# Patient Record
Sex: MALE | Race: OTHER | NOT HISPANIC OR LATINO | URBAN - METROPOLITAN AREA
[De-identification: names, ages, dates, MRNs, and addresses within clinical notes are randomized per-mention and may not be internally consistent; named-entity substitution may affect disease eponyms.]

---

## 2020-12-01 ENCOUNTER — INPATIENT (INPATIENT)
Facility: HOSPITAL | Age: 70
LOS: 0 days | Discharge: HOME | DRG: 177 | End: 2020-12-02
Attending: INTERNAL MEDICINE | Admitting: INTERNAL MEDICINE
Payer: COMMERCIAL

## 2020-12-01 VITALS
HEART RATE: 102 BPM | TEMPERATURE: 100 F | DIASTOLIC BLOOD PRESSURE: 99 MMHG | OXYGEN SATURATION: 92 % | RESPIRATION RATE: 18 BRPM | HEIGHT: 66 IN | WEIGHT: 145.06 LBS | SYSTOLIC BLOOD PRESSURE: 195 MMHG

## 2020-12-01 DIAGNOSIS — E87.5 HYPERKALEMIA: ICD-10-CM

## 2020-12-01 DIAGNOSIS — I77.0 ARTERIOVENOUS FISTULA, ACQUIRED: Chronic | ICD-10-CM

## 2020-12-01 DIAGNOSIS — R94.31 ABNORMAL ELECTROCARDIOGRAM [ECG] [EKG]: ICD-10-CM

## 2020-12-01 DIAGNOSIS — D64.9 ANEMIA, UNSPECIFIED: ICD-10-CM

## 2020-12-01 DIAGNOSIS — N18.6 END STAGE RENAL DISEASE: ICD-10-CM

## 2020-12-01 DIAGNOSIS — R19.7 DIARRHEA, UNSPECIFIED: ICD-10-CM

## 2020-12-01 DIAGNOSIS — R63.8 OTHER SYMPTOMS AND SIGNS CONCERNING FOOD AND FLUID INTAKE: ICD-10-CM

## 2020-12-01 DIAGNOSIS — R77.8 OTHER SPECIFIED ABNORMALITIES OF PLASMA PROTEINS: ICD-10-CM

## 2020-12-01 DIAGNOSIS — U07.1 COVID-19: ICD-10-CM

## 2020-12-01 DIAGNOSIS — I10 ESSENTIAL (PRIMARY) HYPERTENSION: ICD-10-CM

## 2020-12-01 LAB
ALBUMIN SERPL ELPH-MCNC: 3.4 G/DL — SIGNIFICANT CHANGE UP (ref 3.3–5)
ALBUMIN SERPL ELPH-MCNC: 3.9 G/DL — SIGNIFICANT CHANGE UP (ref 3.3–5)
ALP SERPL-CCNC: 65 U/L — SIGNIFICANT CHANGE UP (ref 40–120)
ALP SERPL-CCNC: 77 U/L — SIGNIFICANT CHANGE UP (ref 40–120)
ALT FLD-CCNC: 10 U/L — SIGNIFICANT CHANGE UP (ref 10–45)
ALT FLD-CCNC: 9 U/L — LOW (ref 10–45)
ANION GAP SERPL CALC-SCNC: 24 MMOL/L — HIGH (ref 5–17)
ANION GAP SERPL CALC-SCNC: 25 MMOL/L — HIGH (ref 5–17)
APPEARANCE UR: CLEAR — SIGNIFICANT CHANGE UP
APTT BLD: 29.6 SEC — SIGNIFICANT CHANGE UP (ref 27.5–35.5)
AST SERPL-CCNC: 11 U/L — SIGNIFICANT CHANGE UP (ref 10–40)
AST SERPL-CCNC: 12 U/L — SIGNIFICANT CHANGE UP (ref 10–40)
BACTERIA # UR AUTO: PRESENT /HPF
BASE EXCESS BLDV CALC-SCNC: -3.7 MMOL/L — SIGNIFICANT CHANGE UP
BASOPHILS # BLD AUTO: 0.01 K/UL — SIGNIFICANT CHANGE UP (ref 0–0.2)
BASOPHILS NFR BLD AUTO: 0.2 % — SIGNIFICANT CHANGE UP (ref 0–2)
BILIRUB SERPL-MCNC: 0.4 MG/DL — SIGNIFICANT CHANGE UP (ref 0.2–1.2)
BILIRUB SERPL-MCNC: 0.4 MG/DL — SIGNIFICANT CHANGE UP (ref 0.2–1.2)
BILIRUB UR-MCNC: NEGATIVE — SIGNIFICANT CHANGE UP
BUN SERPL-MCNC: 120 MG/DL — HIGH (ref 7–23)
BUN SERPL-MCNC: 125 MG/DL — HIGH (ref 7–23)
CA-I SERPL-SCNC: 0.96 MMOL/L — LOW (ref 1.12–1.3)
CALCIUM SERPL-MCNC: 8.1 MG/DL — LOW (ref 8.4–10.5)
CALCIUM SERPL-MCNC: 8.5 MG/DL — SIGNIFICANT CHANGE UP (ref 8.4–10.5)
CHLORIDE SERPL-SCNC: 94 MMOL/L — LOW (ref 96–108)
CHLORIDE SERPL-SCNC: 94 MMOL/L — LOW (ref 96–108)
CK SERPL-CCNC: 134 U/L — SIGNIFICANT CHANGE UP (ref 30–200)
CO2 SERPL-SCNC: 19 MMOL/L — LOW (ref 22–31)
CO2 SERPL-SCNC: 20 MMOL/L — LOW (ref 22–31)
COLOR SPEC: YELLOW — SIGNIFICANT CHANGE UP
CREAT SERPL-MCNC: 15.67 MG/DL — HIGH (ref 0.5–1.3)
CREAT SERPL-MCNC: 16.1 MG/DL — HIGH (ref 0.5–1.3)
CRP SERPL-MCNC: 5.69 MG/DL — HIGH (ref 0–0.4)
D DIMER BLD IA.RAPID-MCNC: 822 NG/ML DDU — HIGH
DIFF PNL FLD: ABNORMAL
EOSINOPHIL # BLD AUTO: 0.01 K/UL — SIGNIFICANT CHANGE UP (ref 0–0.5)
EOSINOPHIL NFR BLD AUTO: 0.2 % — SIGNIFICANT CHANGE UP (ref 0–6)
EPI CELLS # UR: ABNORMAL /HPF (ref 0–5)
FERRITIN SERPL-MCNC: 2679 NG/ML — HIGH (ref 30–400)
FIBRINOGEN PPP-MCNC: 394 MG/DL — SIGNIFICANT CHANGE UP (ref 258–438)
FLU A RESULT: SIGNIFICANT CHANGE UP
FLU A RESULT: SIGNIFICANT CHANGE UP
FLUAV AG NPH QL: SIGNIFICANT CHANGE UP
FLUBV AG NPH QL: SIGNIFICANT CHANGE UP
GAS PNL BLDV: 135 MMOL/L — LOW (ref 138–146)
GAS PNL BLDV: SIGNIFICANT CHANGE UP
GLUCOSE SERPL-MCNC: 124 MG/DL — HIGH (ref 70–99)
GLUCOSE SERPL-MCNC: 162 MG/DL — HIGH (ref 70–99)
GLUCOSE UR QL: 250
HBV SURFACE AB SER-ACNC: REACTIVE — SIGNIFICANT CHANGE UP
HBV SURFACE AG SER-ACNC: SIGNIFICANT CHANGE UP
HCO3 BLDV-SCNC: 20 MMOL/L — SIGNIFICANT CHANGE UP (ref 20–27)
HCT VFR BLD CALC: 30.6 % — LOW (ref 39–50)
HCT VFR BLD CALC: 33.4 % — LOW (ref 39–50)
HCV AB S/CO SERPL IA: 0.09 S/CO — SIGNIFICANT CHANGE UP
HCV AB SERPL-IMP: SIGNIFICANT CHANGE UP
HGB BLD-MCNC: 10.5 G/DL — LOW (ref 13–17)
HGB BLD-MCNC: 9.5 G/DL — LOW (ref 13–17)
IMM GRANULOCYTES NFR BLD AUTO: 0.5 % — SIGNIFICANT CHANGE UP (ref 0–1.5)
INR BLD: 0.98 — SIGNIFICANT CHANGE UP (ref 0.88–1.16)
KETONES UR-MCNC: NEGATIVE — SIGNIFICANT CHANGE UP
LACTATE SERPL-SCNC: 0.9 MMOL/L — SIGNIFICANT CHANGE UP (ref 0.5–2)
LDH SERPL L TO P-CCNC: 272 U/L — HIGH (ref 50–242)
LEUKOCYTE ESTERASE UR-ACNC: NEGATIVE — SIGNIFICANT CHANGE UP
LYMPHOCYTES # BLD AUTO: 1.13 K/UL — SIGNIFICANT CHANGE UP (ref 1–3.3)
LYMPHOCYTES # BLD AUTO: 19.6 % — SIGNIFICANT CHANGE UP (ref 13–44)
MAGNESIUM SERPL-MCNC: 2.4 MG/DL — SIGNIFICANT CHANGE UP (ref 1.6–2.6)
MCHC RBC-ENTMCNC: 29.8 PG — SIGNIFICANT CHANGE UP (ref 27–34)
MCHC RBC-ENTMCNC: 29.8 PG — SIGNIFICANT CHANGE UP (ref 27–34)
MCHC RBC-ENTMCNC: 31 GM/DL — LOW (ref 32–36)
MCHC RBC-ENTMCNC: 31.4 GM/DL — LOW (ref 32–36)
MCV RBC AUTO: 94.9 FL — SIGNIFICANT CHANGE UP (ref 80–100)
MCV RBC AUTO: 95.9 FL — SIGNIFICANT CHANGE UP (ref 80–100)
MONOCYTES # BLD AUTO: 0.46 K/UL — SIGNIFICANT CHANGE UP (ref 0–0.9)
MONOCYTES NFR BLD AUTO: 8 % — SIGNIFICANT CHANGE UP (ref 2–14)
NEUTROPHILS # BLD AUTO: 4.12 K/UL — SIGNIFICANT CHANGE UP (ref 1.8–7.4)
NEUTROPHILS NFR BLD AUTO: 71.5 % — SIGNIFICANT CHANGE UP (ref 43–77)
NITRITE UR-MCNC: NEGATIVE — SIGNIFICANT CHANGE UP
NRBC # BLD: 0 /100 WBCS — SIGNIFICANT CHANGE UP (ref 0–0)
NRBC # BLD: 0 /100 WBCS — SIGNIFICANT CHANGE UP (ref 0–0)
NT-PROBNP SERPL-SCNC: HIGH PG/ML (ref 0–300)
PCO2 BLDV: 34 MMHG — LOW (ref 41–51)
PH BLDV: 7.4 — SIGNIFICANT CHANGE UP (ref 7.32–7.43)
PH UR: 8.5 — HIGH (ref 5–8)
PHOSPHATE SERPL-MCNC: 4.8 MG/DL — HIGH (ref 2.5–4.5)
PLATELET # BLD AUTO: 103 K/UL — LOW (ref 150–400)
PLATELET # BLD AUTO: 110 K/UL — LOW (ref 150–400)
PO2 BLDV: 54 MMHG — SIGNIFICANT CHANGE UP
POTASSIUM BLDV-SCNC: 5.2 MMOL/L — HIGH (ref 3.5–4.9)
POTASSIUM SERPL-MCNC: 5.3 MMOL/L — SIGNIFICANT CHANGE UP (ref 3.5–5.3)
POTASSIUM SERPL-MCNC: 5.4 MMOL/L — HIGH (ref 3.5–5.3)
POTASSIUM SERPL-SCNC: 5.3 MMOL/L — SIGNIFICANT CHANGE UP (ref 3.5–5.3)
POTASSIUM SERPL-SCNC: 5.4 MMOL/L — HIGH (ref 3.5–5.3)
PROCALCITONIN SERPL-MCNC: 1.53 NG/ML — HIGH (ref 0.02–0.1)
PROT SERPL-MCNC: 7.3 G/DL — SIGNIFICANT CHANGE UP (ref 6–8.3)
PROT SERPL-MCNC: 8.4 G/DL — HIGH (ref 6–8.3)
PROT UR-MCNC: 100 MG/DL
PROTHROM AB SERPL-ACNC: 11.8 SEC — SIGNIFICANT CHANGE UP (ref 10.6–13.6)
RBC # BLD: 3.19 M/UL — LOW (ref 4.2–5.8)
RBC # BLD: 3.52 M/UL — LOW (ref 4.2–5.8)
RBC # FLD: 14.9 % — HIGH (ref 10.3–14.5)
RBC # FLD: 15.1 % — HIGH (ref 10.3–14.5)
RBC CASTS # UR COMP ASSIST: ABNORMAL /HPF
RSV RESULT: SIGNIFICANT CHANGE UP
RSV RNA RESP QL NAA+PROBE: SIGNIFICANT CHANGE UP
SAO2 % BLDV: 86 % — SIGNIFICANT CHANGE UP
SARS-COV-2 RNA SPEC QL NAA+PROBE: DETECTED
SODIUM SERPL-SCNC: 138 MMOL/L — SIGNIFICANT CHANGE UP (ref 135–145)
SODIUM SERPL-SCNC: 138 MMOL/L — SIGNIFICANT CHANGE UP (ref 135–145)
SP GR SPEC: 1.02 — SIGNIFICANT CHANGE UP (ref 1–1.03)
TROPONIN T SERPL-MCNC: 0.1 NG/ML — CRITICAL HIGH (ref 0–0.01)
TROPONIN T SERPL-MCNC: 0.11 NG/ML — CRITICAL HIGH (ref 0–0.01)
UROBILINOGEN FLD QL: 0.2 E.U./DL — SIGNIFICANT CHANGE UP
WBC # BLD: 4.88 K/UL — SIGNIFICANT CHANGE UP (ref 3.8–10.5)
WBC # BLD: 5.76 K/UL — SIGNIFICANT CHANGE UP (ref 3.8–10.5)
WBC # FLD AUTO: 4.88 K/UL — SIGNIFICANT CHANGE UP (ref 3.8–10.5)
WBC # FLD AUTO: 5.76 K/UL — SIGNIFICANT CHANGE UP (ref 3.8–10.5)
WBC UR QL: < 5 /HPF — SIGNIFICANT CHANGE UP

## 2020-12-01 PROCEDURE — 71045 X-RAY EXAM CHEST 1 VIEW: CPT | Mod: 26

## 2020-12-01 PROCEDURE — 99223 1ST HOSP IP/OBS HIGH 75: CPT | Mod: GC,AI

## 2020-12-01 PROCEDURE — 99291 CRITICAL CARE FIRST HOUR: CPT | Mod: CS

## 2020-12-01 PROCEDURE — 93010 ELECTROCARDIOGRAM REPORT: CPT

## 2020-12-01 RX ORDER — ACETAMINOPHEN 500 MG
650 TABLET ORAL ONCE
Refills: 0 | Status: COMPLETED | OUTPATIENT
Start: 2020-12-01 | End: 2020-12-01

## 2020-12-01 RX ORDER — LOSARTAN POTASSIUM 100 MG/1
50 TABLET, FILM COATED ORAL DAILY
Refills: 0 | Status: DISCONTINUED | OUTPATIENT
Start: 2020-12-01 | End: 2020-12-02

## 2020-12-01 RX ORDER — LOSARTAN POTASSIUM 100 MG/1
1 TABLET, FILM COATED ORAL
Qty: 0 | Refills: 0 | DISCHARGE

## 2020-12-01 RX ADMIN — Medication 650 MILLIGRAM(S): at 08:02

## 2020-12-01 RX ADMIN — LOSARTAN POTASSIUM 50 MILLIGRAM(S): 100 TABLET, FILM COATED ORAL at 19:48

## 2020-12-01 NOTE — ED ADULT NURSE NOTE - CHPI ED NUR SYMPTOMS NEG
no chills/no headache/no shortness of breath/no rash/no vomiting/no cough/no decreased eating/drinking/no abdominal pain

## 2020-12-01 NOTE — H&P ADULT - NSHPLABSRESULTS_GEN_ALL_CORE
.  LABS:                         10.5   5.76  )-----------( 110      ( 01 Dec 2020 07:49 )             33.4     12-01    138  |  94<L>  |  120<H>  ----------------------------<  162<H>  5.4<H>   |  19<L>  |  15.67<H>    Ca    8.5      01 Dec 2020 07:49    TPro  8.4<H>  /  Alb  3.9  /  TBili  0.4  /  DBili  x   /  AST  12  /  ALT  10  /  AlkPhos  77  12-01    PT/INR - ( 01 Dec 2020 07:49 )   PT: 11.8 sec;   INR: 0.98          PTT - ( 01 Dec 2020 07:49 )  PTT:29.6 sec    CARDIAC MARKERS ( 01 Dec 2020 07:49 )  x     / 0.11 ng/mL / 134 U/L / x     / x          Serum Pro-Brain Natriuretic Peptide: 41043 pg/mL (12-01 @ 07:49)    Lactate, Blood: 0.9 mmol/L (12-01 @ 07:48)      RADIOLOGY, EKG & ADDITIONAL TESTS: Reviewed.

## 2020-12-01 NOTE — H&P ADULT - ASSESSMENT
71 yo Wallisian-speaking M with PMHx ESRD (On HD M/W/F) presenting to [facility] ED on 12/1 following missed dialysis session day prior in the setting of fever and 5-6 episodes of diarrhea. Patient is being admitted to [facility] for HD.

## 2020-12-01 NOTE — H&P ADULT - PROBLEM SELECTOR PLAN 1
Patient with known hx of ESRD, following Dr. Gary Lopes ([facility]). On HD for Monday, Wednesday, Friday schedule. Last HD session Friday 11/27, missed session Monday 11/30 due to diarrhea, subjective fevers. Now presenting to [facility] with electrolyte derangements in the setting of missed HD and positive COVID. Patient is being admitted for HD.  - Renal following, plan for HD today (12/1) Patient with known hx of ESRD, following Dr. Gary Lopes ([facility]). On HD for Monday, Wednesday, Friday schedule. Last HD session Friday 11/27, missed session Monday 11/30 due to diarrhea, subjective fevers. Now presenting to [facility] with electrolyte derangements, elevated Cr to 15 (unclear baseline) in the setting of missed HD and positive COVID. Patient is being admitted for HD.  - Renal following, plan for HD today (12/1)

## 2020-12-01 NOTE — ED PROVIDER NOTE - RESPIRATORY, MLM
Breath sounds clear and equal bilaterally. Breath sounds clear and equal bilaterally. Scattered rhonchi b/l. Scattered rhonchi and crackles b/l

## 2020-12-01 NOTE — H&P ADULT - PROBLEM SELECTOR PLAN 7
Patient with known hx of HTN, takes losartan 50mg QD at home.  - C/w losartan 50mg QD On initial labs, Hb 10.5 with MCV 94.9. Unknown baseline. Normocytic anemia likely anemia of chronic disease in setting of ESRD.  - Maintain active T/S  - Transfuse for Hb <7

## 2020-12-01 NOTE — H&P ADULT - PROBLEM SELECTOR PLAN 6
On initial labs, Hb 10.5 with MCV 94.9. Unknown baseline. Normocytic anemia likely anemia of chronic disease in setting of ESRD.  - Maintain active T/S  - Transfuse for Hb <7 On initial labs, K 5.4. No evidence of peak T on EKG. Likely elevated in the setting of missed HD.  - F/u post-HD labs

## 2020-12-01 NOTE — H&P ADULT - NSHPSOCIALHISTORY_GEN_ALL_CORE
Denies tobacco, etOH, illicit drug use.  Lives at home with wife.  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  Ambulates on own at baseline without assistance.

## 2020-12-01 NOTE — ED ADULT NURSE REASSESSMENT NOTE - NS ED NURSE REASSESS COMMENT FT1
Interventions as noted, gracyDr. Damian stallings at bedside for evaluation, as well as EDT for EKG, pt. utd on current poc, with understanding verbalized

## 2020-12-01 NOTE — CONSULT NOTE ADULT - SUBJECTIVE AND OBJECTIVE BOX
Patient is a 70y old  Male who presents with a chief complaint of     HPI: 70M PMH ESRD on dialysis (for 8 years now), on M,W,F schedule presents from dialysis center today for fever of 100.6 and multiple episodes of nb diarrhea yesterday with some ongoing dry cough. Pt states that diarrhea started yesterday which was watery green/ yellow/ non-bloody with multiple episodes yesterday "every time I drank water I went" and so he was unable to get his scheduled dialysis yesterday. Sxs improved today, and pt states that he did not have any diarrhea today and so went to get dialysis (had it rescheduled it for today) and was found to have fever with temp of 100.6 at dialysis center and was sent to ED for further evaluation. Pt's last dialysis was Friday (4 days ago). C/o dry cough X few days. Pt has not received flu vaccine this season and has no known Covid 19 exposures. No CP, SOB, anosmia, N,V, HA. No other complaints. Nephrology consulted for hemodialysis.       PAST MEDICAL & SURGICAL HISTORY:  ESRD on hemodialysis    AVF (arteriovenous fistula)          Allergies:  penicillin (Hives)  penicillins (Rash)      Home Medications:       Hospital Medications:   MEDICATIONS  (STANDING):      SOCIAL HISTORY:  Denies ETOh, Smoking,     Family History:  FAMILY HISTORY:        VITALS:  T(F): 99.6 (12-01-20 @ 07:30), Max: 99.6 (12-01-20 @ 07:30)  HR: 91 (12-01-20 @ 07:50)  BP: 163/77 (12-01-20 @ 07:50)  RR: 17 (12-01-20 @ 07:50)  SpO2: 98% (12-01-20 @ 07:50)  Wt(kg): --    Height (cm): 167.6 (12-01 @ 07:30)  Weight (kg): 65.8 (12-01 @ 07:30)  BMI (kg/m2): 23.4 (12-01 @ 07:30)  BSA (m2): 1.74 (12-01 @ 07:30)  CAPILLARY BLOOD GLUCOSE          Review of Systems:  deferred per COVID19 protocol     PHYSICAL EXAM:  deferred per COVID19 protocol     LABS:  12-01    138  |  94<L>  |  120<H>  ----------------------------<  162<H>  5.4<H>   |  19<L>  |  15.67<H>    Ca    8.5      01 Dec 2020 07:49    TPro  8.4<H>  /  Alb  3.9  /  TBili  0.4  /  DBili      /  AST  12  /  ALT  10  /  AlkPhos  77  12-01    Creatinine Trend: 15.67 <--                        10.5   5.76  )-----------( 110      ( 01 Dec 2020 07:49 )             33.4     Urine Studies:          70M PMH ESRD M,W,F presents from dialysis center for fever of 100.6 and multiple episodes of nb diarrhea with some ongoing dry cough. Admitted w/COVID. Nephrology consulted for hemodialysis.     Assessment/Plan:     #ESRD on HD MWF   last hemodialysis 11/27 per schedule   next hemodialysis today 12/1 per schedule   electrolytes noted   volume status noted   dry weight TBD     #anemia  Hb > 10 at goal   no indication for EPO or IV Iron at this time   transfusion as per primary team     #renal bone disease   Ca ~9  Phos pending    PTH pending   VitD25/1,25 pending   no indication for Hectorol at this time     Thank you for the opportunity to participate in the care of your patient. The nephrology service remains available to assist with any questions or concerns. Please feel free to reach us by paging the on-call nephrology fellow for urgent issues or as below.     Ko Price M.D.   PGY-4, Nephrology Fellow   C: [phone]   P: [phone]

## 2020-12-01 NOTE — H&P ADULT - NSHPPHYSICALEXAM_GEN_ALL_CORE
VITAL SIGNS:  T(C): 36.9 (12-01-20 @ 10:13), Max: 37.6 (12-01-20 @ 07:30)  T(F): 98.5 (12-01-20 @ 10:13), Max: 99.6 (12-01-20 @ 07:30)  HR: 80 (12-01-20 @ 10:13) (80 - 102)  BP: 150/81 (12-01-20 @ 10:13) (150/81 - 195/99)  RR: 18 (12-01-20 @ 10:13) (17 - 18)  SpO2: 99% (12-01-20 @ 10:13) (92% - 99%)    PHYSICAL EXAM:    Constitutional: WDWN resting comfortably in bed; NAD  Head: NC/AT  Eyes: PERRL, EOMI, clear conjunctiva  ENT: no nasal discharge; uvula midline, no oropharyngeal erythema or exudates; MMM  Neck: supple; no JVD or thyromegaly; no carotid bruits  Respiratory: CTA B/L; no W/R/R, no retractions; speaking full sentences; no use of accessory muscles  Cardiac: +S1/S2; RRR; systolic ejection murmur heard best over apex  Gastrointestinal: soft, NT/ND; no rebound or guarding; +BSx4  Extremities: WWP, no clubbing or cyanosis; no peripheral edema  Musculoskeletal: NROM x4; no joint swelling, tenderness or erythema  Vascular: 2+ radial, DP/PT pulses B/L  Neurologic: AAOx3

## 2020-12-01 NOTE — ED PROVIDER NOTE - OBJECTIVE STATEMENT
71 yo M with PMH of ESRD on dialysis (for 8 years now), on M,W,F schedule presents from dialysis center today for fever of 100.6 and multiple episodes of nb diarrhea yesterday with some ongoing dry cough. Pt states that diarrrhea started yeysterday with watery green/ yellow/ non-bloody with multiple episodes and so he was unable to jony 69 yo M with PMH of ESRD on dialysis (for 8 years now), on M,W,F schedule presents from dialysis center today for fever of 100.6 and multiple episodes of nb diarrhea yesterday with some ongoing dry cough. Pt states that diarrhea started yesterday which was watery green/ yellow/ non-bloody with multiple episodes yesterday "every time I drank water I went" and so he was unable to get his scheduled dialysis yesterday/. Sxs improved today and pt states that he did not have any diarrhea today and so went to get dialysis (had rescheduled it for today) and was found to have fever with temp of 100.6 at dialysis center and was sent to ED for further evaluation. C/o dry cough X few days. Pt has not received flu vaccine this season and has no known Covid 19 exposures. No CP, SOB, anosmia, N,V, HA. No other complaints. No throat pain/  ear ache. 69 yo M with PMH of ESRD on dialysis (for 8 years now), on M,W,F schedule presents from dialysis center today for fever of 100.6 and multiple episodes of nb diarrhea yesterday with some ongoing dry cough. Pt states that diarrhea started yesterday which was watery green/ yellow/ non-bloody with multiple episodes yesterday "every time I drank water I went" and so he was unable to get his scheduled dialysis yesterday. Sxs improved today, and pt states that he did not have any diarrhea today and so went to get dialysis (had it rescheduled it for today) and was found to have fever with temp of 100.6 at dialysis center and was sent to ED for further evaluation. Pt's last dialysis was Friday (4 days ago). C/o dry cough X few days. Pt has not received flu vaccine this season and has no known Covid 19 exposures. No CP, SOB, anosmia, N,V, HA. No other complaints. No throat pain/  ear ache. 71 yo M with HTN and PMH of ESRD on dialysis (for 8 years now), on M,W,F schedule presents from dialysis center today for fever of 100.6 and multiple episodes of nb diarrhea yesterday with some ongoing dry cough. No abd pain, urinary sxs. Pt states that diarrhea started yesterday which was watery green/ yellow/ non-bloody with multiple episodes yesterday "every time I drank water I went" and so he was unable to get his scheduled dialysis yesterday. Sxs improved today, and pt states that he did not have any diarrhea today and so went to get dialysis (had it rescheduled for today) and was found to have fever with temp of 100.6 at dialysis center and was sent to ED for further evaluation. Pt's last dialysis was Friday (4 days ago). C/o dry cough X few days. Pt has not received flu vaccine this season and has no known Covid 19 exposures. No CP, SOB, anosmia, N,V, HA, abd pain. No other complaints. No throat pain/ ear pain.

## 2020-12-01 NOTE — ED PROVIDER NOTE - CRITICAL CARE PROVIDED
Stable direct patient care (not related to procedure)/interpretation of diagnostic studies/consultation with other physicians/additional history taking/documentation

## 2020-12-01 NOTE — ED PROVIDER NOTE - MUSCULOSKELETAL, MLM
Spine appears normal, range of motion is not limited, no muscle or joint tenderness, no leg edema or calf TTP.

## 2020-12-01 NOTE — ED ADULT TRIAGE NOTE - ARRIVAL INFO ADDITIONAL COMMENTS
febrile at dialysis center this am, reports diarrhea yesterday. denies nausea, vomiting, abd pain, cough, chest pain, sob.

## 2020-12-01 NOTE — H&P ADULT - PROBLEM SELECTOR PLAN 4
On initial labs, notable for elevated troponin T 0.11 with EKG changes as noted above.  - Management as above On admission, EKG noted to have evidence of NSR with HR 88, Q waves in V1/2, T wave inversions in V4-V6. No prior EKG to compare. No hx of abnormal EKGs, cardiac disease or dysfunction. No hx of prior MI. Patient without complaints of chest pain. Physical exam notable for systolic ejection murmur at apex. On initial labs, notable for elevated troponin T 0.11. Of note, patient states being scheduled for outpatient cardiac stress test on Thursday 12/3 as pre-op eval for renal transplant.   - F/u TTE   - Repeat troponins, consider cardiology consult if rising/unable to clear

## 2020-12-01 NOTE — H&P ADULT - PROBLEM SELECTOR PLAN 2
Patient presenting with 2 day history of 5-6 episodes of loose, yellow diarrhea and subjective fevers at home. Started Monday 11/30. No sick contacts or recent travel. No difficulty breathing or respiratory distress. Elevated D-dimer, ferritin, CRP. Satting 98-99% at rest on RA on admission.   - COVID PCR positive (12/1)  - O2 requirements: 98-99% at rest on RA   - Does not qualify for remdesivir/decadron at this time  - Daily COVID labs (D-dimer, ferritin, CRP)

## 2020-12-01 NOTE — H&P ADULT - PROBLEM SELECTOR PLAN 9
F: None  E: Replete PRN  N: Diet, DASH/TLC/Renal  DVT ppx: SCDs  GI ppx: None  Dispo: RMF    FULL CODE

## 2020-12-01 NOTE — ED PROVIDER NOTE - CLINICAL SUMMARY MEDICAL DECISION MAKING FREE TEXT BOX
69 yo M with HTN and PMH of ESRD on dialysis (for 8 years now), on M,W,F schedule presents from dialysis center today for fever of 100.6 and multiple episodes of nb diarrhea yesterday with some ongoing dry cough. No abd pain, urinary sxs. Pt states that diarrhea started yesterday which was watery green/ yellow/ non-bloody with multiple episodes yesterday "every time I drank water I went" and so he was unable to get his scheduled dialysis yesterday. Sxs improved today, and pt states that he did not have any diarrhea today and so went to get dialysis (had it rescheduled for today) and was found to have fever with temp of 100.6 at dialysis center and was sent to ED for further evaluation. Pt's last dialysis was Friday (4 days ago). C/o dry cough X few days. Pt has not received flu vaccine this season and has no known Covid 19 exposures. No CP, SOB, anosmia, N,V, HA, abd pain. No other complaints. No throat pain/ ear pain.  ED course: Pt with O2 sats 92% RA and initially hypertensive which improved without intervention. Temp 99.6 in ED today. Covid 19 w/up initiated. Labs noted. Pt with K of 5.4 and CXR with b/l infiltrates. ECG with T wave inversions, but no acute ST elevations. Pt with no CP. Labs noted and pt with anion gap - likely sec to uremia. Case discussed with Nephrology and pt to get dialysis arranged while being admitted. Non-toxic appearing. Tested positive for Covid 19. Findings discussed with pt at length. Pt admitted to Hospitalist service.

## 2020-12-01 NOTE — H&P ADULT - PROBLEM SELECTOR PLAN 5
On initial labs, K 5.4. No evidence of peak T on EKG. Likely elevated in the setting of missed HD.  - F/u post-HD labs On initial labs, notable for elevated troponin T 0.11 with EKG changes as noted above.  - Management as above

## 2020-12-01 NOTE — H&P ADULT - PROBLEM SELECTOR PLAN 3
On admission, EKG noted to have evidence of NSR with HR 88, Q waves in V1/2, T wave inversions in V4-V6. No prior EKG to compare. No hx of abnormal EKGs, cardiac disease or dysfunction. No hx of prior MI. Patient without complaints of chest pain. On initial labs, notable for elevated troponin T 0.11.   - F/u TTE   - Repeat troponins, consider cardiology consult if rising/unable to clear On admission, EKG noted to have evidence of NSR with HR 88, Q waves in V1/2, T wave inversions in V4-V6. No prior EKG to compare. No hx of abnormal EKGs, cardiac disease or dysfunction. No hx of prior MI. Patient without complaints of chest pain. Physical exam notable for systolic ejection murmur at apex. On initial labs, notable for elevated troponin T 0.11. Of note, patient states being scheduled for outpatient cardiac stress test on Thursday 12/3 as pre-op eval for renal transplant.   - F/u TTE   - Repeat troponins, consider cardiology consult if rising/unable to clear Patient presenting with 2 day history of loose, NB diarrhea, yellow-green in color. Likely 2/2 COVID. No abd pain, nausea/vomiting.   - F/u GI PCR, C. diff  - If negative, consider Imodium PRN

## 2020-12-01 NOTE — ED ADULT NURSE NOTE - OBJECTIVE STATEMENT
febrile at dialysis center this am, reported Tmax 101,  reports diarrhea yesterday. denies nausea, vomiting, abd pain, cough, chest pain, sob.  Denies complaint otherwise, nad at present, ambulating with steady gait, denies sob/cp, ESRD, HD Tu, TH, Sa, AVF LOREE

## 2020-12-01 NOTE — ED ADULT NURSE NOTE - NSIMPLEMENTINTERV_GEN_ALL_ED
Implemented All Universal Safety Interventions:  [place] to call system. Call bell, personal items and telephone within reach. Instruct patient to call for assistance. Room bathroom lighting operational. Non-slip footwear when patient is off stretcher. Physically safe environment: no spills, clutter or unnecessary equipment. Stretcher in lowest position, wheels locked, appropriate side rails in place.

## 2020-12-01 NOTE — H&P ADULT - HISTORY OF PRESENT ILLNESS
69 yo English-speaking M with PMHx HTN, ESRD (On HD M/W/F) presenting to [facility] ED on 12/1 following missed dialysis session day prior in the setting of fever and 5-6 episodes of diarrhea. Patient states having regular dialysis session last Friday 11/27 and was schedule to return on Monday 11/30 however began experiencing 5-6 episodes of loose yellow diarrhea over the course of the day starting Monday as well as subjective fevers so he did not attend HD that day. He returned to HD this morning where he was screening and found to have T 100.6F oral. In light of his fever and episodes of diarrhea, he was sent to [facility] ED at which point COVID PCR returned positive. Patient however denies all other ROS apart from subjective fevers and diarrhea. Denies chest pain, shortness of breath, dyspnea on exertion, abd pain, nausea/vomiting, dysuria, changes in smell/taste. Denies sick contacts or recent travel.     Patient is being admitted to [facility] for need for HD and monitoring for COVID.    In the ED,  VS: T 99.6F, , /99, RR 18, SpO2 92% on RA  Labs: Hb 10.5, Plt 110, D-dimer 822, K 5.4, Cl 94, Bicarb 19, AG 25, BUN/Cr 120/15.67, Protein 8.4, CRP 5.69, Ferritin 2679, Procal 1.53, , Top T 0.11, BNP 99086  EKG: NSR with HR 88, Q waves in V1/2, T wave inversions in V4-V6  CXR: Large b/l infiltrates   Orders: Tylenol 650mg PO x1

## 2020-12-01 NOTE — H&P ADULT - PROBLEM SELECTOR PLAN 8
F: None  E: Replete PRN  N: Diet, DASH/TLC/Renal  DVT ppx: SCDs  GI ppx: None  Dispo: RMF    FULL CODE Patient with known hx of HTN, takes losartan 50mg QD at home.  - C/w losartan 50mg QD

## 2020-12-01 NOTE — H&P ADULT - ATTENDING COMMENTS
Patient discussed with resident team and plan of care reviewed. I have personally reviewed all pertinent labs and imaging and performed an independent history and physical. Resident note personally reviewed, and I agree with above resident note with the following additions:    70YOM with history of essential HTN, ESRD (HD MWF via LUE AVF, undergoing workup for transplant) admitted due to need for HD (last time on Friday), which he has missed due to fevers and episodes of diarrhea. Found to be COVID19 positive without hypoxemia.    Renal consulted for HD. EKG changes appear more consistent with LBBB (and his murmur appears more likely to be radiation from his fistula), though still with TWI in V5-V6. Given that he's undergoing transplant workup as well, reasonable to get TTE. Trop 0.1 (plateaued) likely 2/2 poor clearance in ESRD patient. COVID19 is incidental, not hypoxemic and thus not candidate for dexamethasone/remdesivir. Diarrhea could be from COVID - ruling out other causes, if negative then can use Immodium. Anticipate likely d/c tomorrow with COVID precautions.

## 2020-12-02 ENCOUNTER — TRANSCRIPTION ENCOUNTER (OUTPATIENT)
Age: 70
End: 2020-12-02

## 2020-12-02 VITALS
HEART RATE: 84 BPM | OXYGEN SATURATION: 94 % | SYSTOLIC BLOOD PRESSURE: 145 MMHG | RESPIRATION RATE: 18 BRPM | TEMPERATURE: 100 F | DIASTOLIC BLOOD PRESSURE: 75 MMHG

## 2020-12-02 LAB
A1C WITH ESTIMATED AVERAGE GLUCOSE RESULT: 6.6 % — HIGH (ref 4–5.6)
ALBUMIN SERPL ELPH-MCNC: 3.8 G/DL — SIGNIFICANT CHANGE UP (ref 3.3–5)
ALP SERPL-CCNC: 66 U/L — SIGNIFICANT CHANGE UP (ref 40–120)
ALT FLD-CCNC: 11 U/L — SIGNIFICANT CHANGE UP (ref 10–45)
ANION GAP SERPL CALC-SCNC: 20 MMOL/L — HIGH (ref 5–17)
AST SERPL-CCNC: 30 U/L — SIGNIFICANT CHANGE UP (ref 10–40)
BILIRUB SERPL-MCNC: 0.7 MG/DL — SIGNIFICANT CHANGE UP (ref 0.2–1.2)
BUN SERPL-MCNC: 64 MG/DL — HIGH (ref 7–23)
CALCIUM SERPL-MCNC: 8.5 MG/DL — SIGNIFICANT CHANGE UP (ref 8.4–10.5)
CHLORIDE SERPL-SCNC: 92 MMOL/L — LOW (ref 96–108)
CHOLEST SERPL-MCNC: 144 MG/DL — SIGNIFICANT CHANGE UP
CO2 SERPL-SCNC: 24 MMOL/L — SIGNIFICANT CHANGE UP (ref 22–31)
CREAT SERPL-MCNC: 10.15 MG/DL — HIGH (ref 0.5–1.3)
CRP SERPL-MCNC: 8.99 MG/DL — HIGH (ref 0–0.4)
D DIMER BLD IA.RAPID-MCNC: 1016 NG/ML DDU — HIGH
ESTIMATED AVERAGE GLUCOSE: 143 MG/DL — HIGH (ref 68–114)
FERRITIN SERPL-MCNC: 5489 NG/ML — HIGH (ref 30–400)
GLUCOSE SERPL-MCNC: 104 MG/DL — HIGH (ref 70–99)
HCT VFR BLD CALC: 34.3 % — LOW (ref 39–50)
HDLC SERPL-MCNC: 30 MG/DL — LOW
HGB BLD-MCNC: 10.7 G/DL — LOW (ref 13–17)
LIPID PNL WITH DIRECT LDL SERPL: 79 MG/DL — SIGNIFICANT CHANGE UP
MAGNESIUM SERPL-MCNC: 2.2 MG/DL — SIGNIFICANT CHANGE UP (ref 1.6–2.6)
MCHC RBC-ENTMCNC: 30.1 PG — SIGNIFICANT CHANGE UP (ref 27–34)
MCHC RBC-ENTMCNC: 31.2 GM/DL — LOW (ref 32–36)
MCV RBC AUTO: 96.6 FL — SIGNIFICANT CHANGE UP (ref 80–100)
NON HDL CHOLESTEROL: 114 MG/DL — SIGNIFICANT CHANGE UP
NRBC # BLD: 0 /100 WBCS — SIGNIFICANT CHANGE UP (ref 0–0)
PHOSPHATE SERPL-MCNC: 4.4 MG/DL — SIGNIFICANT CHANGE UP (ref 2.5–4.5)
PLATELET # BLD AUTO: 112 K/UL — LOW (ref 150–400)
POTASSIUM SERPL-MCNC: 5.4 MMOL/L — HIGH (ref 3.5–5.3)
POTASSIUM SERPL-SCNC: 5.4 MMOL/L — HIGH (ref 3.5–5.3)
PROT SERPL-MCNC: 8.2 G/DL — SIGNIFICANT CHANGE UP (ref 6–8.3)
RBC # BLD: 3.55 M/UL — LOW (ref 4.2–5.8)
RBC # FLD: 15.1 % — HIGH (ref 10.3–14.5)
SODIUM SERPL-SCNC: 136 MMOL/L — SIGNIFICANT CHANGE UP (ref 135–145)
TRIGL SERPL-MCNC: 174 MG/DL — HIGH
TSH SERPL-MCNC: 0.87 UIU/ML — SIGNIFICANT CHANGE UP (ref 0.35–4.94)
WBC # BLD: 5.6 K/UL — SIGNIFICANT CHANGE UP (ref 3.8–10.5)
WBC # FLD AUTO: 5.6 K/UL — SIGNIFICANT CHANGE UP (ref 3.8–10.5)

## 2020-12-02 PROCEDURE — 84132 ASSAY OF SERUM POTASSIUM: CPT

## 2020-12-02 PROCEDURE — 83880 ASSAY OF NATRIURETIC PEPTIDE: CPT

## 2020-12-02 PROCEDURE — 81001 URINALYSIS AUTO W/SCOPE: CPT

## 2020-12-02 PROCEDURE — 82803 BLOOD GASES ANY COMBINATION: CPT

## 2020-12-02 PROCEDURE — 90935 HEMODIALYSIS ONE EVALUATION: CPT

## 2020-12-02 PROCEDURE — 83036 HEMOGLOBIN GLYCOSYLATED A1C: CPT

## 2020-12-02 PROCEDURE — 85610 PROTHROMBIN TIME: CPT

## 2020-12-02 PROCEDURE — 84443 ASSAY THYROID STIM HORMONE: CPT

## 2020-12-02 PROCEDURE — 82728 ASSAY OF FERRITIN: CPT

## 2020-12-02 PROCEDURE — 85027 COMPLETE CBC AUTOMATED: CPT

## 2020-12-02 PROCEDURE — 80053 COMPREHEN METABOLIC PANEL: CPT

## 2020-12-02 PROCEDURE — 86706 HEP B SURFACE ANTIBODY: CPT

## 2020-12-02 PROCEDURE — 84145 PROCALCITONIN (PCT): CPT

## 2020-12-02 PROCEDURE — 82550 ASSAY OF CK (CPK): CPT

## 2020-12-02 PROCEDURE — 84295 ASSAY OF SERUM SODIUM: CPT

## 2020-12-02 PROCEDURE — 80061 LIPID PANEL: CPT

## 2020-12-02 PROCEDURE — 93306 TTE W/DOPPLER COMPLETE: CPT

## 2020-12-02 PROCEDURE — 99285 EMERGENCY DEPT VISIT HI MDM: CPT

## 2020-12-02 PROCEDURE — 85730 THROMBOPLASTIN TIME PARTIAL: CPT

## 2020-12-02 PROCEDURE — 85384 FIBRINOGEN ACTIVITY: CPT

## 2020-12-02 PROCEDURE — 83615 LACTATE (LD) (LDH) ENZYME: CPT

## 2020-12-02 PROCEDURE — 36415 COLL VENOUS BLD VENIPUNCTURE: CPT

## 2020-12-02 PROCEDURE — 93005 ELECTROCARDIOGRAM TRACING: CPT

## 2020-12-02 PROCEDURE — 87340 HEPATITIS B SURFACE AG IA: CPT

## 2020-12-02 PROCEDURE — 84484 ASSAY OF TROPONIN QUANT: CPT

## 2020-12-02 PROCEDURE — 87635 SARS-COV-2 COVID-19 AMP PRB: CPT

## 2020-12-02 PROCEDURE — 99239 HOSP IP/OBS DSCHRG MGMT >30: CPT | Mod: CS

## 2020-12-02 PROCEDURE — 87631 RESP VIRUS 3-5 TARGETS: CPT

## 2020-12-02 PROCEDURE — 82330 ASSAY OF CALCIUM: CPT

## 2020-12-02 PROCEDURE — 93308 TTE F-UP OR LMTD: CPT | Mod: 26

## 2020-12-02 PROCEDURE — 86140 C-REACTIVE PROTEIN: CPT

## 2020-12-02 PROCEDURE — G0257: CPT

## 2020-12-02 PROCEDURE — 86803 HEPATITIS C AB TEST: CPT

## 2020-12-02 PROCEDURE — 85379 FIBRIN DEGRADATION QUANT: CPT

## 2020-12-02 PROCEDURE — 71045 X-RAY EXAM CHEST 1 VIEW: CPT | Mod: 26

## 2020-12-02 PROCEDURE — 85025 COMPLETE CBC W/AUTO DIFF WBC: CPT

## 2020-12-02 PROCEDURE — 87086 URINE CULTURE/COLONY COUNT: CPT

## 2020-12-02 PROCEDURE — 71045 X-RAY EXAM CHEST 1 VIEW: CPT

## 2020-12-02 PROCEDURE — 84100 ASSAY OF PHOSPHORUS: CPT

## 2020-12-02 PROCEDURE — 99199 UNLISTED SPECIAL SVC PX/RPRT: CPT

## 2020-12-02 PROCEDURE — 87040 BLOOD CULTURE FOR BACTERIA: CPT

## 2020-12-02 PROCEDURE — 87184 SC STD DISK METHOD PER PLATE: CPT

## 2020-12-02 PROCEDURE — 83735 ASSAY OF MAGNESIUM: CPT

## 2020-12-02 PROCEDURE — 83605 ASSAY OF LACTIC ACID: CPT

## 2020-12-02 RX ORDER — SODIUM ZIRCONIUM CYCLOSILICATE 10 G/10G
10 POWDER, FOR SUSPENSION ORAL ONCE
Refills: 0 | Status: COMPLETED | OUTPATIENT
Start: 2020-12-02 | End: 2020-12-02

## 2020-12-02 RX ORDER — ACETAMINOPHEN 500 MG
650 TABLET ORAL EVERY 6 HOURS
Refills: 0 | Status: DISCONTINUED | OUTPATIENT
Start: 2020-12-02 | End: 2020-12-02

## 2020-12-02 RX ORDER — APIXABAN 2.5 MG/1
1 TABLET, FILM COATED ORAL
Qty: 60 | Refills: 0
Start: 2020-12-02 | End: 2020-12-31

## 2020-12-02 RX ADMIN — Medication 650 MILLIGRAM(S): at 05:39

## 2020-12-02 RX ADMIN — LOSARTAN POTASSIUM 50 MILLIGRAM(S): 100 TABLET, FILM COATED ORAL at 05:20

## 2020-12-02 RX ADMIN — Medication 650 MILLIGRAM(S): at 13:03

## 2020-12-02 RX ADMIN — SODIUM ZIRCONIUM CYCLOSILICATE 10 GRAM(S): 10 POWDER, FOR SUSPENSION ORAL at 14:29

## 2020-12-02 RX ADMIN — Medication 650 MILLIGRAM(S): at 12:33

## 2020-12-02 RX ADMIN — Medication 650 MILLIGRAM(S): at 05:19

## 2020-12-02 NOTE — DISCHARGE NOTE NURSING/CASE MANAGEMENT/SOCIAL WORK - NSDCFUADDAPPT_GEN_ALL_CORE_FT
please follow up with your HD dialysis clinic for your next HD session     please follow up with your primary care doctor     Please follow up with Dr. De Souza for the risk of clot

## 2020-12-02 NOTE — DISCHARGE NOTE PROVIDER - NSDCFUADDAPPT_GEN_ALL_CORE_FT
please follow up with your HD dialysis clinic for your next HD session     please follow up with your primary care doctor please follow up with your HD dialysis clinic for your next HD session     please follow up with your primary care doctor     Please follow up with Dr. De Souza for the risk of clot please follow up with your HD dialysis clinic for your next HD session     please schedule an appointment to follow up with your primary care doctor Dr Pascal within 2-4 weeks    Please follow up with Dr. De Souza for the risk of clot

## 2020-12-02 NOTE — DISCHARGE NOTE PROVIDER - NSDCCPCAREPLAN_GEN_ALL_CORE_FT
PRINCIPAL DISCHARGE DIAGNOSIS  Diagnosis: COVID-19  Assessment and Plan of Treatment:        PRINCIPAL DISCHARGE DIAGNOSIS  Diagnosis: COVID-19  Assessment and Plan of Treatment: Upon discharge, you must self-quarantine for 14 days, or until the Department of Health contacts you. Please wear a face mask if you are around other individuals. Try to avoid contact with house members, family, and friends for the duration of this quarantine. Please follow up with your primary care physician within 2-3 weeks of your discharge from [facility]. Please take all medications as prescribed. If you experience any worsening or recurrence of your symptoms, particularly worsening or high fever, shortness of breathe, extreme fatigue, or bloody cough please call [phone] immediately or report to the nearest Emergency Department. If you have any questions or concerns, please do not hesitate to call the hospital at [phone].      SECONDARY DISCHARGE DIAGNOSES  Diagnosis: ESRD on hemodialysis  Assessment and Plan of Treatment: You were admitted to the hospital after a missed hemodialysis session. upon discharge it is important for you to follow up with your hemodialysis clinic for your next session.  Chronic kidney disease is the gradual and permanent loss of kidney function. Normally, the kidneys remove fluids, chemicals, and waste from your blood and turn these waste chemicals into urine. As your kidney disease worse, you lose your ability to remove waste from your body. Call 911 or seek care immediately if your heart is beating faster than normal for you, are confused or very drowsy, have a seizure, have sudden chest pain or shortness of breath. Please follow regularly with your PCP to be properly managed.

## 2020-12-02 NOTE — DISCHARGE NOTE PROVIDER - CARE PROVIDERS DIRECT ADDRESSES
,[email or web address]

## 2020-12-02 NOTE — DISCHARGE NOTE PROVIDER - PROVIDER TOKENS
PROVIDER:[TOKEN:[[id number]],FOLLOWUP:[1 week]] PROVIDER:[TOKEN:[[id number]],FOLLOWUP:[1 week]],PROVIDER:[TOKEN:[[id number]],FOLLOWUP:[2 weeks]]

## 2020-12-02 NOTE — DISCHARGE NOTE NURSING/CASE MANAGEMENT/SOCIAL WORK - PATIENT PORTAL LINK FT
You can access the FollowMyHealth Patient Portal offered by [facility] by registering at the following website: [email or web address]. By joining [facility]’s FollowMyHealth portal, you will also be able to view your health information using other applications (apps) compatible with our system.

## 2020-12-02 NOTE — DISCHARGE NOTE PROVIDER - HOSPITAL COURSE
#Discharge: do not delete    69 yo Hungarian-speaking M with PMHx ESRD (On HD M/W/F) presenting to [facility] ED on 12/1 following missed dialysis session day prior in the setting of fever and 5-6 episodes of diarrhea.  found to be covid positive admitted  for HD.       #ESRD on hemodialysis  Patient with known hx of ESRD, following Dr. Gary Lopes ([facility]). On HD for Monday, Wednesday, Friday schedule. Last HD session Friday 11/27, missed session Monday 11/30 due to diarrhea, subjective fevers. Now presenting to [facility] with electrolyte derangements ( K 5.4. No evidence of peak T on EKG.), elevated Cr to 15 (unclear baseline) in the setting of missed HD and positive COVID. Patient seen and evaluated by inpatient renal however given patient is medically stable, pt was discharged with HD to be resumed at HD clinic.      #COVID-19.    Patient presenting with 2 day history of 5-6 episodes of loose, yellow diarrhea and subjective fevers at home. Started Monday 11/30. No sick contacts or recent travel. No difficulty breathing or respiratory distress. Elevated D-dimer, ferritin, CRP. Satting 98-99% at rest on RA on admission.   - COVID PCR positive (12/1)  - O2 requirements: 98-99% at rest on RA   - Does not qualify for remdesivir/decadron at this time  - to be followed up by PCP        #EKG abnormalities  On admission, EKG noted to have evidence of NSR with HR 88, Q waves in V1/2, T wave inversions in V4-V6. No prior EKG to compare. No hx of abnormal EKGs, cardiac disease or dysfunction. No hx of prior MI. Patient without complaints of chest pain. Physical exam notable for systolic ejection murmur at apex. On initial labs, notable for elevated troponin T 0.11 repeat trop downtrending. Of note, patient states being scheduled for outpatient cardiac stress test on Thursday 12/3 as pre-op eval for renal transplant.   -to be follow up outpatient      #Anemia  On initial labs, Hb 10.5 with MCV 94.9. Unknown baseline. Normocytic anemia likely anemia of chronic disease in setting of ESRD.  -to be follow up by PCP      #HTN (hypertension).   Patient with known hx of HTN, continued losartan 50mg QD.      New medications: none  Labs to be followed outpatient: cmp  Exam to be followed outpatient: none   #Discharge: do not delete    71 yo Slovenian-speaking M with PMHx ESRD (On HD M/W/F) presenting to [facility] ED on 12/1 following missed dialysis session day prior in the setting of fever and 5-6 episodes of diarrhea.  found to be covid positive admitted  for HD.       #ESRD on hemodialysis  Patient with known hx of ESRD, following Dr. Gary Lopes ([facility]). On HD for Monday, Wednesday, Friday schedule. Last HD session Friday 11/27, missed session Monday 11/30 due to diarrhea, subjective fevers. Now presenting to [facility] with electrolyte derangements ( K 5.4. No evidence of peak T on EKG), elevated Cr to 15 (unclear baseline) in the setting of missed HD and positive COVID. Patient seen and evaluated by inpatient renal, hyperkalemia treated with 1 dose 10mg lokelma. patient medically stable for discharged with HD to be resumed at HD clinic.      #COVID-19.    Patient presenting with 2 day history of 5-6 episodes of loose, yellow diarrhea and subjective fevers at home. Started Monday 11/30. No sick contacts or recent travel. No difficulty breathing or respiratory distress. Elevated D-dimer, ferritin, CRP. Satting 98-99% at rest on RA on admission.   - COVID PCR positive (12/1)  - O2 requirements: 98-99% at rest on RA   - Does not qualify for remdesivir/decadron at this time  - to be followed up by PCP        #EKG abnormalities  On admission, EKG noted to have evidence of NSR with HR 88, Q waves in V1/2, T wave inversions in V4-V6. No prior EKG to compare. No hx of abnormal EKGs, cardiac disease or dysfunction. No hx of prior MI. Patient without complaints of chest pain. Physical exam notable for systolic ejection murmur at apex. On initial labs, notable for elevated troponin T 0.11 repeat trop downtrending. Of note, patient states being scheduled for outpatient cardiac stress test on Thursday 12/3 as pre-op eval for renal transplant.   -to be follow up outpatient      #Anemia  On initial labs, Hb 10.5 with MCV 94.9. Unknown baseline. Normocytic anemia likely anemia of chronic disease in setting of ESRD.  -to be follow up by PCP      #HTN (hypertension).   Patient with known hx of HTN, continued losartan 50mg QD.      New medications: none  Labs to be followed outpatient: cmp  Exam to be followed outpatient: none   #Discharge: do not delete    69 yo Greek-speaking M with PMHx ESRD (On HD M/W/F) presenting to [facility] ED on 12/1 following missed dialysis session day prior in the setting of fever and 5-6 episodes of diarrhea.  found to be covid positive admitted  for HD.       #ESRD on hemodialysis  Patient with known hx of ESRD, following Dr. Gary Lopes ([facility]). On HD for Monday, Wednesday, Friday schedule. Last HD session Friday 11/27, missed session Monday 11/30 due to diarrhea, subjective fevers. Now presenting to [facility] with electrolyte derangements ( K 5.4. No evidence of peak T on EKG), elevated Cr to 15 (unclear baseline) in the setting of missed HD and positive COVID. Patient seen and evaluated by inpatient renal, hyperkalemia treated with 1 dose 10mg lokelma. patient medically stable for discharged with HD to be resumed at HD clinic - confirmed that his HD center can do HD for COVID+ patients.      #COVID-19.    Patient presenting with 2 day history of 5-6 episodes of loose, yellow diarrhea and subjective fevers at home. Started Monday 11/30. No sick contacts or recent travel. No difficulty breathing or respiratory distress. Elevated D-dimer, ferritin, CRP. Satting 98-99% at rest on RA on admission.   - COVID PCR positive (12/1)  - O2 requirements: 98-99% at rest on RA   - Does not qualify for remdesivir/decadron at this time  - to be followed up by PCP  -meets criteria for extended VTE prophylaxis, will be given Eliquis 2.5mg bid x30 days (ESRD patient) and vascular followup        #EKG abnormalities  On admission, EKG noted to have evidence of NSR with HR 88, Q waves in V1/2, T wave inversions in V4-V6. No prior EKG to compare. No hx of abnormal EKGs, cardiac disease or dysfunction. No hx of prior MI. Patient without complaints of chest pain. Physical exam notable for systolic ejection murmur at apex. On initial labs, notable for elevated troponin T 0.11 repeat trop downtrending. Of note, patient states being scheduled for outpatient cardiac stress test on Thursday 12/3 as pre-op eval for renal transplant.   -to be follow up outpatient      #Anemia  On initial labs, Hb 10.5 with MCV 94.9. Unknown baseline. Normocytic anemia likely anemia of chronic disease in setting of ESRD.  -to be follow up by PCP      #HTN (hypertension).   Patient with known hx of HTN, continued losartan 50mg QD.      New medications: Eliquis 2.5mg bid x 30 days  Labs to be followed outpatient: cmp  Exam to be followed outpatient: none    ATTENDING ATTESTATION  Date of Service: 12/2/2020    I interviewed and examined Octaviano Paulino on the day of discharge and greater than 30 minutes were spent by the team on processing their hospital discharge and coordinating their post-hospital care.     I discussed the care plan with the resident team. I agree with the discharge plan as outlined in the Discharge Summary. I have personally reviewed the above discharge summary and made changes where necessary, and as noted below.    70YOM with history of essential HTN, ESRD (HD MWF via LUE AVF, undergoing workup for transplant) admitted due to need for HD (last time on Friday), which he has missed due to fevers and episodes of diarrhea. Found to be COVID19 positive - incidental, not hypoxemic and thus not candidate for dexamethasone/remdesivir. Notably procalcitonin elevated, though not interpretable in ESRD patient, he has no symptoms of pneumonia, normal WBC with normal differential, no focal consolidation on CXR so not treated for pneumonia. Received scheduled dialysis here, confirmed with his dialysis center can accommodate COVID+ patients. He is stable for discharge home today to continue HD at his usual HD center. He is currently undergoing workup for renal transplant as well as outpatient.    Per our COVID19 discharge protocol - meets criteria for extended VTE ppx - given ESRD patient he will get Eliquis 2.5mg bid and vascular followup. Will also provide with pulse ox to monitor SpO2 at home.    Physical exam on day of discharge:  General: pleasant, appropriate, no acute distress. Participating appropriately in interview in Greek  HEENT: NC/AT, MMM  Neck: soft, supple, no lymphadenopathy  Cardiac: regular rhythm, normal rate, normal s1/s2, no murmurs, rubs, or gallops  Lungs: clear to auscultation bilaterally without wheezes, rales, or rhonchi. Normal work of breathing. Speaking in complete sentences.  Abdomen: soft, nontender, nondistended. Bowel sounds present and normoactive.   Extremities: moving all extremities. No edema.  Neuro: awake, alert, oriented x4. Follows commands. Moving all extremities. Sensation intact.  Psych: no evidence of AVH.  Skin: LUE AVF with ludy Gomez MD  Attending Hospitalist

## 2020-12-02 NOTE — DISCHARGE NOTE PROVIDER - NSDCMRMEDTOKEN_GEN_ALL_CORE_FT
losartan 50 mg oral tablet: 1 tab(s) orally once a day   Eliquis 2.5 mg oral tablet: 1 tab(s) orally 2 times a day (with meals)   losartan 50 mg oral tablet: 1 tab(s) orally once a day

## 2020-12-02 NOTE — DISCHARGE NOTE PROVIDER - CARE PROVIDER_API CALL
Aruna De Souza)  LX [facility] Surgery  Vascular  [address]  [place], [place] [address]  Phone: [phone]  Fax: [phone]  Follow Up Time: 1 week   Aruna De Souza)  [facility] Surgery  Vascular  [address] [place] [address]  Phone: [phone]  Fax: [phone]  Follow Up Time: 1 week    Brock Pascal  INTERNAL MEDICINE  [address]nd [place] [address]  Phone: [phone]  Fax: [phone]  Follow Up Time: 2 weeks

## 2020-12-03 LAB
-  AMPICILLIN: SIGNIFICANT CHANGE UP
-  CLINDAMYCIN: SIGNIFICANT CHANGE UP
-  ERYTHROMYCIN: SIGNIFICANT CHANGE UP
-  LEVOFLOXACIN: SIGNIFICANT CHANGE UP
-  PENICILLIN: SIGNIFICANT CHANGE UP
-  VANCOMYCIN: SIGNIFICANT CHANGE UP
CULTURE RESULTS: SIGNIFICANT CHANGE UP
METHOD TYPE: SIGNIFICANT CHANGE UP
ORGANISM # SPEC MICROSCOPIC CNT: SIGNIFICANT CHANGE UP
ORGANISM # SPEC MICROSCOPIC CNT: SIGNIFICANT CHANGE UP
SPECIMEN SOURCE: SIGNIFICANT CHANGE UP

## 2020-12-06 LAB
CULTURE RESULTS: SIGNIFICANT CHANGE UP
CULTURE RESULTS: SIGNIFICANT CHANGE UP
SPECIMEN SOURCE: SIGNIFICANT CHANGE UP
SPECIMEN SOURCE: SIGNIFICANT CHANGE UP

## 2020-12-07 PROBLEM — N18.6 END STAGE RENAL DISEASE: Chronic | Status: ACTIVE | Noted: 2020-12-01

## 2020-12-07 PROBLEM — I10 ESSENTIAL (PRIMARY) HYPERTENSION: Chronic | Status: ACTIVE | Noted: 2020-12-01

## 2020-12-07 LAB
CULTURE RESULTS: SIGNIFICANT CHANGE UP
SPECIMEN SOURCE: SIGNIFICANT CHANGE UP

## 2020-12-09 DIAGNOSIS — Z88.0 ALLERGY STATUS TO PENICILLIN: ICD-10-CM

## 2020-12-09 DIAGNOSIS — E11.22 TYPE 2 DIABETES MELLITUS WITH DIABETIC CHRONIC KIDNEY DISEASE: ICD-10-CM

## 2020-12-09 DIAGNOSIS — E87.5 HYPERKALEMIA: ICD-10-CM

## 2020-12-09 DIAGNOSIS — R11.2 NAUSEA WITH VOMITING, UNSPECIFIED: ICD-10-CM

## 2020-12-09 DIAGNOSIS — N25.0 RENAL OSTEODYSTROPHY: ICD-10-CM

## 2020-12-09 DIAGNOSIS — R19.7 DIARRHEA, UNSPECIFIED: ICD-10-CM

## 2020-12-09 DIAGNOSIS — N18.6 END STAGE RENAL DISEASE: ICD-10-CM

## 2020-12-09 DIAGNOSIS — U07.1 COVID-19: ICD-10-CM

## 2020-12-09 DIAGNOSIS — Z99.2 DEPENDENCE ON RENAL DIALYSIS: ICD-10-CM

## 2020-12-09 DIAGNOSIS — Z79.01 LONG TERM (CURRENT) USE OF ANTICOAGULANTS: ICD-10-CM

## 2020-12-09 DIAGNOSIS — I12.0 HYPERTENSIVE CHRONIC KIDNEY DISEASE WITH STAGE 5 CHRONIC KIDNEY DISEASE OR END STAGE RENAL DISEASE: ICD-10-CM

## 2020-12-09 DIAGNOSIS — I44.7 LEFT BUNDLE-BRANCH BLOCK, UNSPECIFIED: ICD-10-CM

## 2020-12-09 DIAGNOSIS — D64.9 ANEMIA, UNSPECIFIED: ICD-10-CM

## 2020-12-14 PROBLEM — Z00.00 ENCOUNTER FOR PREVENTIVE HEALTH EXAMINATION: Status: ACTIVE | Noted: 2020-12-14

## 2020-12-17 ENCOUNTER — APPOINTMENT (OUTPATIENT)
Dept: VASCULAR SURGERY | Facility: CLINIC | Age: 70
End: 2020-12-17
Payer: MEDICARE

## 2020-12-17 DIAGNOSIS — Z99.2 END STAGE RENAL DISEASE: ICD-10-CM

## 2020-12-17 DIAGNOSIS — U07.1 COVID-19: ICD-10-CM

## 2020-12-17 DIAGNOSIS — N18.6 END STAGE RENAL DISEASE: ICD-10-CM

## 2020-12-17 PROCEDURE — 99446 NTRPROF PH1/NTRNET/EHR 5-10: CPT

## 2020-12-17 NOTE — ASSESSMENT
[FreeTextEntry1] : 70 year old male with PMH of ESRD (M/W/F dialysis) presented to the ED on 12/1/2020 after missing dialysis session because of diarrhea and fever. During hospital stay he was found to have COVID positive. Was given one month VTE prophylaxis of Eliquis 2.5mg to finish January 2.

## 2020-12-17 NOTE — HISTORY OF PRESENT ILLNESS
[FreeTextEntry1] : Verbal consent for telehealth services was obtained from the patient. Visit provided using real-time telehealth services with two-way video and audio platform.\par \par The location of the patient: Home\par The location of the Provider: [address] [place] [address]\par  services used for [place]oran translation\par \par Persons participating in the telehealth service and their role in the encounter:\par Patient: Octaviano Canela\par Physician: Dr. De Souza\par Time spent: 15 minutes \par \par 70 year old male with PMH of ESRD (M/W/F dialysis) presented to the ED on 12/1/2020 after missing dialysis session because of diarrhea and fever. During hospital stay he was found to have COVID positive. He did not meet criteria for Remdesivir/decadron during hospital stay. He met criteria for VTE prophylaxis and was discharged on Eliquis 2.5mg BID for 30 days. no CTA chest or BLE US done in the hospital. \par

## 2021-01-28 NOTE — ED ADULT NURSE NOTE - NS TRANSFER PATIENT BELONGINGS
29 y.o. male, PMH of gastritis, comes in c/o left flank pain and urinary frequency which started suddenly overnight. States urine appeared pink. No fever/chills, CP/SOB/abdominal pain. No testicular pain. No penile discharge.  No rash. On exam, pt in NAD, AAOx3, head NC/AT, CN II-XII intact, lungs CTA B/L, CV S1S2 regular, abdomen soft/NT/ND/(+)BS, back (-) CVA tenderness, ext (-) edema, motor 5/5x4, sensation intact. Pt with passed stone on CT. WIll discharge. Strainer given. Urology follow up given. None

## 2022-01-01 NOTE — PATIENT PROFILE ADULT - NSPRESCRUSEDDRG_GEN_A_NUR
[facility]   Intensive Care Unit Daily Note    Name:   Harley (Male-MACHO Estevez  Parents: Olga and Arcenio Estevez  YOB: 2021    History of Present Illness    AGA male di/di twin infant born at 27 4/7 PMA and 1070 grams by , classical due to  labor, PPROM of twin A, GBS positive.    Admitted directly to the NICU for evaluation and management of prematurity and respiratory failure.      Patient Active Problem List   Diagnosis     Premature infant of 27 weeks gestation     Feeding problem of      Respiratory failure of      Need for observation and evaluation of  for sepsis     Twin, mate liveborn, born in hospital, delivered by  delivery        Interval History   No acute concerns overnight, stable on mechanical ventilation.       Assessment & Plan   Overall Status:  13-hour old  VLBW male infant who is now 27w4d PMA.     This patient is critically ill with respiratory failure requiring mechanical conventional ventilation.      Vascular Access:  PIV  UAC-removed on 5/10  UVC- appropriate position confirmed by radiograph, needs for TPN, antibiotics.      FEN:    Vitals:    22   Weight: 1.07 kg (2 lb 5.7 oz)     Weight change:   Birth weight not on file change from BW    Poor feeding due to prematurity.  Growth curves: initially symmetric AGA  Infant does not currently meet criteria for diagnosis of malnutrition - see assessment from dietician.    Appropriate daily I/O, ~ at fluid goal with adequate UO and stool.   NPO    - Currently NPO, advanceTPN/IL. Review with Pharm D.  - TF goal 80 ml/kg/day. Monitor fluid status and TPN labs.  - Plan to start small enteral feeds, per feeding protocol, once clinically stable.  - Review with dietician and lactation specialists - see separate notes.   - vitamin D/supplements/fortification per dietician's recs.     Metabolic Bone Disease of Prematurity:  - optimize  nutrition and Vit D - review with dietician.   - monitor serial AP levels q2 weeks until < 400.   No results found for: ALKPHOS      Respiratory:  Ongoing failure, due to RDS, requiring mechanical ventilation.     FiO2 (%): 21 %  Resp: 55  Ventilation Mode: SPRVC  Rate Set (breaths/minute): 40 breaths/min  Tidal Volume Set (mL): 5.5 mL  PEEP (cm H2O): 5 cmH2O  Pressure Support (cm H2O): 8 cmH2O  Oxygen Concentration (%): 25 %     - Wean as tolerates.  - Continue routine CR monitoring.    Arterial Blood Gas  Recent Labs   Lab 05/10/22  0845 05/10/22  0551 22  2111   PH 7.29* 7.29* 7.23*   PCO2 48* 49* 56*   PO2 49* 55* 115*   HCO3 23 23 23   O2PER 21 21 35        Apnea of Prematurity:  No ABDS.   - Continue caffeine administration until ~33-34 weeks PMA.       Cardiovascular:    Good BP and perfusion. No murmur.  - obtain CCHD screen.   - Continue routine CR monitoring.    Renal:  At risk for KEITH, with potential for CKD, due to prematurity and nephrotoxic medication exposure.   Currently with good UO.   - monitor UO/fluid status   - monitor serial Cr levels - consider repeating at 14 and 30 do.   No results found for: CR    ID:  Receiving empiric antibiotic therapy for possible sepsis due to  delivery/PPROM and RDS, maternal GBS positive, evaluation NTD.   - Continue IV ampicillin and gentamicin. Length of therapy will depend on clinical course and final results of cultures/ sepsis evaluation labs, including serial CRP.  - routine IP surveillance tests for MRSA and SARS-CoV-2 on DOL 7.    No results found for: CRP       Hematology:  CBC on admission wnl  Anemia - risk is high.   Transfusion Hx:  - consider darbepoetin   - plan to evaluate need for iron supplementation at/after 2 weeks of age when tolerating full feeds.  - Monitor serial hemoglobin.  - Transfuse as needed w goal Hgb >10  - Monitor serial ferritin levels, per dietician's recommendations.  Hemoglobin   Date Value Ref Range Status    2022 (L) 15.0 - 24.0 g/dL Final     No results found for: HONG    Platelet Count   Date Value Ref Range Status   2022 211 150 - 450 10e3/uL Final       Hyperbilirubinemia: Indirect hyperbilirubinemia due to NPO and prematurity.   Maternal blood type B+. Infant Blood type AB POS ALLEN   negative  Phototherapy not yet indicated.   - Monitor serial t/d bilirubin levels.   - Determine need for phototherapy based on the Felch Premie Bili Tool.  No results found for: BILITOTAL  No results found for: DBIL      CNS:  No concerns. Exam wnl  At risk for IVH/PVL.    - Obtain screening head ultrasounds on DOL 7 (eval for IVH) and at ~35-36 wks GA (eval for PVL).  - monitor clinical exam and weekly OFC measurements.    - Developmental cares per NICU protocol    Sedation/ Pain Control:   - Nonpharmacologic comfort measures. Sweetease with painful minor procedures.    Ophthalmology:   At risk for ROP due to prematurity   - schedule ROP with Peds Ophthalmology.    Thermoregulation: Stable with current support.   - Continue to monitor temperature and provide thermal support as indicated.    HCM and Discharge planning:   Screening tests indicated before discharge:  - [place] at 24 hr  - Repeat NMS at 14 do  - Final repeat NMS at 30 do  - CCHD screen at 24-48 hr and on RA.  - Hearing screen at/after 35wk PMA  - Carseat trial to be done just PTD  - OT input.  - Continue standard NICU cares and family education plan.  - consider outpatient care in [facility] and [facility]ICU Neurodevelopment Follow-up [facility].    Immunizations   BW too low for Hep B immunization at <24 hr.  - give Hep B immunization at 21-30 days old or PTD  - plan for Synagis administration during RSV season (<29 wk GA)  There is no immunization history for the selected administration types on file for this patient.     Medications   Current Facility-Administered Medications   Medication     ampicillin 100 mg in NS injection  PEDS/NICU     Breast Milk label for barcode scanning 1 Bottle     caffeine citrate (CAFCIT) injection 10 mg     gentamicin (PF) (GARAMYCIN) injection NICU 5.5 mg     glycerin (PEDI-LAX) Suppository 0.125 suppository     [START ON 2022] hepatitis b vaccine recombinant (ENGERIX-B) injection 10 mcg     lipids 20% for neonates (Daily dose divided into 2 doses - each infused over 10 hours)     lipids 20% for neonates (Daily dose divided into 2 doses - each infused over 10 hours)      Starter TPN - 5% amino acid (PREMASOL) in 10% Dextrose 150 mL, calcium gluconate 600 mg, heparin 0.5 Units/mL     sodium chloride 0.45% lock flush 0.5 mL     sodium chloride 0.45% lock flush 0.8 mL     sodium chloride 0.45% lock flush 0.8 mL     sucrose (SWEET-EASE) solution 0.2-2 mL     Vitamin A 50,000 units/ml (15,000 mcg/mL) injection 5,000 Units        Physical Exam    GENERAL: NAD, male infant. Overall appearance c/w CGA.  RESPIRATORY: Chest CTA, no retractions. Good air entry on vent   CV: RRR, no murmur, strong/sym pulses in UE/LE, good perfusion.   ABDOMEN: soft, +BS, no HSM.   CNS: Normal tone for GA. AFOF. MAEE.   Rest of exam unchanged.     Communications   Parents:   Name Home Phone Work Phone Mobile Phone Relationship Lgl Grd   OLGA ESTEVEZ [phone] Mother    KAYE ESTEVEZ [phone] Parent       Family lives in [place]  Updated after rounds.     Care Conferences: n/a    PCPs:   Infant PCP: Physician No Ref-Primary  Maternal OB PCP:   Information for the patient's mother:  Olga Estevez [[id number]]   Dianne Torres     MFM:Dr. Marcos  Delivering Provider:   Dr. Godinez  Admission note routed to all;    Health Care Team:  Patient discussed with the care team.    A/P, imaging studies, laboratory data, medications and family situation reviewed.    Fabienne Umana MD   No

## 2023-10-09 ENCOUNTER — EMERGENCY (EMERGENCY)
Facility: HOSPITAL | Age: 73
LOS: 1 days | Discharge: HOME | End: 2023-10-09
Admitting: EMERGENCY MEDICINE
Payer: MEDICARE

## 2023-10-09 VITALS
RESPIRATION RATE: 17 BRPM | TEMPERATURE: 98 F | SYSTOLIC BLOOD PRESSURE: 159 MMHG | OXYGEN SATURATION: 100 % | DIASTOLIC BLOOD PRESSURE: 78 MMHG | HEART RATE: 74 BPM

## 2023-10-09 VITALS
RESPIRATION RATE: 16 BRPM | TEMPERATURE: 97 F | DIASTOLIC BLOOD PRESSURE: 75 MMHG | OXYGEN SATURATION: 98 % | HEART RATE: 82 BPM | SYSTOLIC BLOOD PRESSURE: 146 MMHG

## 2023-10-09 DIAGNOSIS — I77.0 ARTERIOVENOUS FISTULA, ACQUIRED: Chronic | ICD-10-CM

## 2023-10-09 LAB
ALBUMIN SERPL ELPH-MCNC: 3.8 G/DL — SIGNIFICANT CHANGE UP (ref 3.4–5)
ALP SERPL-CCNC: 139 U/L — HIGH (ref 40–120)
ALT FLD-CCNC: 23 U/L — SIGNIFICANT CHANGE UP (ref 12–42)
ANION GAP SERPL CALC-SCNC: 10 MMOL/L — SIGNIFICANT CHANGE UP (ref 9–16)
AST SERPL-CCNC: 23 U/L — SIGNIFICANT CHANGE UP (ref 15–37)
BILIRUB SERPL-MCNC: 0.5 MG/DL — SIGNIFICANT CHANGE UP (ref 0.2–1.2)
BUN SERPL-MCNC: 34 MG/DL — HIGH (ref 7–23)
CALCIUM SERPL-MCNC: 9.8 MG/DL — SIGNIFICANT CHANGE UP (ref 8.5–10.5)
CHLORIDE SERPL-SCNC: 95 MMOL/L — LOW (ref 96–108)
CO2 SERPL-SCNC: 31 MMOL/L — SIGNIFICANT CHANGE UP (ref 22–31)
CREAT SERPL-MCNC: 5.55 MG/DL — HIGH (ref 0.5–1.3)
EGFR: 10 ML/MIN/1.73M2 — LOW
GLUCOSE SERPL-MCNC: 145 MG/DL — HIGH (ref 70–99)
POTASSIUM SERPL-MCNC: 5 MMOL/L — SIGNIFICANT CHANGE UP (ref 3.5–5.3)
POTASSIUM SERPL-SCNC: 5 MMOL/L — SIGNIFICANT CHANGE UP (ref 3.5–5.3)
PROT SERPL-MCNC: 10.2 G/DL — HIGH (ref 6.4–8.2)
SODIUM SERPL-SCNC: 136 MMOL/L — SIGNIFICANT CHANGE UP (ref 132–145)

## 2023-10-09 PROCEDURE — 99285 EMERGENCY DEPT VISIT HI MDM: CPT

## 2023-10-09 PROCEDURE — 93925 LOWER EXTREMITY STUDY: CPT | Mod: 26

## 2023-10-09 PROCEDURE — 93970 EXTREMITY STUDY: CPT | Mod: 26

## 2023-10-09 NOTE — ED ADULT NURSE NOTE - NSFALLUNIVINTERV_ED_ALL_ED
Bed/Stretcher in lowest position, wheels locked, appropriate side rails in place/Call bell, personal items and telephone in reach/Instruct patient to call for assistance before getting out of bed/chair/stretcher/Non-slip footwear applied when patient is off stretcher/[place] to call system/Physically safe environment - no spills, clutter or unnecessary equipment/Purposeful proactive rounding/Room/bathroom lighting operational, light cord in reach

## 2023-10-09 NOTE — ED ADULT NURSE NOTE - OBJECTIVE STATEMENT
Patient's toes on b/l feet bright red and painful, especially left foot little toe. AV fistula on left arm, arm precautions maintained.

## 2023-10-09 NOTE — ED PROVIDER NOTE - CLINICAL SUMMARY MEDICAL DECISION MAKING FREE TEXT BOX
73-year-old male presents this [facility] for pain, paresthesias of the bilateral feet  Labs, coags, venous and arterial studies ordered  We will continue to monitor patient

## 2023-10-09 NOTE — ED PROVIDER NOTE - PROGRESS NOTE DETAILS
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 Aguero, vascular surgeon, who states that because patient has patent posterior tibialis, he can follow-up on an outpatient basis.  Patient stable for discharge.  Results with patient.  Patient presents with recent plan.  Agreed to follow-up primary care doctor in 2 to 3 days.

## 2023-10-09 NOTE — ED PROVIDER NOTE - PATIENT PORTAL LINK FT
You can access the FollowMyHealth Patient Portal offered by [facility] by registering at the following website: [email or web address]. By joining [employer]’s FollowMyHealth portal, you will also be able to view your health information using other applications (apps) compatible with our system.

## 2023-10-09 NOTE — ED PROVIDER NOTE - NSFOLLOWUPINSTRUCTIONS_ED_ALL_ED_FT
Please call [phone] at 9 AM tomorrow morning to make an appointment with Dr. David, vascular surgeon.    Overview  Peripheral arterial disease (PAD) occurs when the blood vessels (arteries) that supply blood to the legs, belly, pelvis, arms, or neck get narrow or blocked. This reduces blood flow to that area. The legs are affected most often.    PAD is often caused by fatty buildup (plaque) in the arteries. This buildup is also called "hardening" of the arteries. Your risk of PAD increases if you smoke or have high cholesterol, high blood pressure, diabetes, or a family history of PAD.    Many people don't have symptoms. If you do have symptoms, you may have weak or tired legs, difficulty walking or balancing, or pain. If you have pain, you might feel a tight, aching, or squeezing pain in the calf, foot, thigh, or buttock that occurs during exercise. The pain usually gets worse during exercise and goes away when you rest. If PAD gets worse, you may have symptoms of poor blood flow, such as leg pain when you rest.    Medicines and lifestyle changes may help your symptoms and lower your risk of heart attack and stroke. In some cases surgery or other treatment is needed. It is important that you follow up with your doctor.    Follow-up care is a key part of your treatment and safety. Be sure to make and go to all appointments, and call your doctor or nurse advice line (039 in most provinces [place]) if you are having problems. It's also a good idea to know your test results and keep a list of the medicines you take.    How can you care for yourself at home?  Do not smoke. Smoking can make PAD worse. If you need help quitting, talk to your doctor about stop-smoking programs and medicines. These can increase your chances of quitting for good.  Take your medicines exactly as prescribed. Call your doctor or nurse advice line if you think you are having a problem with your medicine.  If you take a blood thinner, such as aspirin, be sure to get instructions about how to take your medicine safely. Blood thinners can cause serious bleeding problems.  Ask your doctor if a cardiac rehab program is right for you. Cardiac rehab can help you make lifestyle changes. In cardiac rehab, a team of health professionals provides education and support to help you make new, healthy habits.  Eat heart-healthy foods such as fruits, vegetables, whole grains, fish, lean meats, and low-fat or non-fat dairy foods. Limit sodium, sugar, and alcohol.  If your doctor recommends it, get more exercise. Walking is a good choice. Bit by bit, increase the amount you walk every day. Try for at least 2½ hours a week. If you have symptoms when you exercise, ask your doctor about a special exercise program that may help relieve your symptoms.  Stay at a healthy weight. Lose weight if you need to.  Take good care of your feet.  Treat cuts and scrapes on your legs right away. Poor blood flow prevents (or slows) quick healing of even small cuts or scrapes. This is even more important if you have diabetes.  Avoid shoes that are too tight or that rub your feet. Shoes should be comfortable and fit well.  Avoid socks or stockings that are tight enough to leave elastic-band marks on your legs. Tight socks can make circulation problems worse.  Keep your feet clean and moisturized to prevent drying and cracking. Place cotton or lamb's wool between your toes to prevent rubbing and to absorb moisture.  If you have a sore on your leg or foot, keep it dry and cover it with a non-stick bandage until you see your doctor.  Avoid infections such as COVID-19, colds, and influenza (flu). Get the flu vaccine every year. Get a pneumococcal vaccine. If you have had one before, ask your doctor whether you need another dose. Stay up to date on your COVID-19 vaccines.  When should you call for help?  	  Call 911 anytime you think you may need emergency care. For example, call if:    You have symptoms of a heart attack. These may include:  Chest pain or pressure, or a strange feeling in the chest.  Sweating.  Shortness of breath.  Nausea or vomiting.  Pain, pressure, or a strange feeling in the back, neck, jaw, or upper belly or in one or both shoulders or arms.  Light-headedness or sudden weakness.  A fast or irregular heartbeat.  After you call 911, the  may tell you to chew 1 adult-strength or 2 to 4 low-dose aspirin. Wait for an ambulance. Do not try to drive yourself.    You have sudden, severe leg pain, and your leg is cool and pale.  You have symptoms of a stroke. These may include:  Sudden numbness, tingling, weakness, or loss of movement in your face, arm, or leg, especially on only one side of your body.  Sudden vision changes.  Sudden trouble speaking.  Sudden confusion or trouble understanding simple statements.  Sudden problems with walking or balance.  A sudden, severe headache that is different from past headaches.  Call your doctor or nurse advice line now or seek immediate medical care if:    You have leg pain that does not go away even if you rest.  Your leg pain changes or gets worse. For example, if you have more pain with normal activity or the same pain with decreased activity, you should call.  You have cold or numb feet or toes.  You have leg or foot sores that are slow to heal.  The skin on your legs or feet changes colour.  You have an open sore on your leg or foot that is infected. Signs of infection include:  Increased pain, swelling, warmth, or redness.  Red streaks leading from the sore.  Pus draining from the sore.  A fever.  Watch closely for changes in your health, and be sure to contact your doctor or nurse advice line if you have any problems.

## 2023-10-09 NOTE — ED PROVIDER NOTE - OBJECTIVE STATEMENT
73-year-old male, medical ESRD on since, hypertension, hyperlipidemia, presents this emergency department for pain in the bilateral feet that is atraumatic.  Patient states that its been ongoing for the last 2 weeks, progressively getting worse.  States it is worse in the morning, and better as he is moving around.  States that his feet feel cold. Denies trauma to head. Denies pain in joint above or below. Denies SOB, CP, calf tenderness/swelling, hemoptysis, recent surgeries, hx of CA, long plane/train/car rides, past clots in legs/lungs. Has been ambulating.

## 2023-10-11 DIAGNOSIS — M79.674 PAIN IN RIGHT TOE(S): ICD-10-CM

## 2023-10-11 DIAGNOSIS — R20.2 PARESTHESIA OF SKIN: ICD-10-CM

## 2023-10-11 DIAGNOSIS — M79.675 PAIN IN LEFT TOE(S): ICD-10-CM

## 2023-10-11 DIAGNOSIS — Z88.0 ALLERGY STATUS TO PENICILLIN: ICD-10-CM

## 2023-10-11 DIAGNOSIS — Z79.01 LONG TERM (CURRENT) USE OF ANTICOAGULANTS: ICD-10-CM

## 2023-10-11 DIAGNOSIS — E78.5 HYPERLIPIDEMIA, UNSPECIFIED: ICD-10-CM

## 2023-10-11 DIAGNOSIS — N18.6 END STAGE RENAL DISEASE: ICD-10-CM

## 2023-10-11 DIAGNOSIS — I70.90 UNSPECIFIED ATHEROSCLEROSIS: ICD-10-CM

## 2023-10-11 DIAGNOSIS — I13.11 HYPERTENSIVE HEART AND CHRONIC KIDNEY DISEASE WITHOUT HEART FAILURE, WITH STAGE 5 CHRONIC KIDNEY DISEASE, OR END STAGE RENAL DISEASE: ICD-10-CM

## 2023-10-11 DIAGNOSIS — Z99.2 DEPENDENCE ON RENAL DIALYSIS: ICD-10-CM

## 2023-10-26 ENCOUNTER — APPOINTMENT (OUTPATIENT)
Dept: VASCULAR SURGERY | Facility: CLINIC | Age: 73
End: 2023-10-26

## 2024-06-02 NOTE — ED ADULT NURSE NOTE - SUICIDE SCREENING QUESTION 3
Assessment as initiated.  Tolerated bladder scan well.  Bathed with chlorhexidine with good tolerance.    No